# Patient Record
Sex: MALE | Race: BLACK OR AFRICAN AMERICAN | Employment: UNEMPLOYED | URBAN - METROPOLITAN AREA
[De-identification: names, ages, dates, MRNs, and addresses within clinical notes are randomized per-mention and may not be internally consistent; named-entity substitution may affect disease eponyms.]

---

## 2024-06-27 ENCOUNTER — OFFICE VISIT (OUTPATIENT)
Age: 69
End: 2024-06-27
Payer: COMMERCIAL

## 2024-06-27 VITALS
BODY MASS INDEX: 23.7 KG/M2 | HEIGHT: 69 IN | RESPIRATION RATE: 15 BRPM | HEART RATE: 93 BPM | TEMPERATURE: 97.9 F | SYSTOLIC BLOOD PRESSURE: 140 MMHG | DIASTOLIC BLOOD PRESSURE: 86 MMHG | OXYGEN SATURATION: 94 % | WEIGHT: 160 LBS

## 2024-06-27 DIAGNOSIS — K40.90 RIGHT INGUINAL HERNIA: Primary | ICD-10-CM

## 2024-06-27 PROCEDURE — 99203 OFFICE O/P NEW LOW 30 MIN: CPT | Performed by: SURGERY

## 2024-06-27 PROCEDURE — 1123F ACP DISCUSS/DSCN MKR DOCD: CPT | Performed by: SURGERY

## 2024-06-27 RX ORDER — LISINOPRIL AND HYDROCHLOROTHIAZIDE 25; 20 MG/1; MG/1
1 TABLET ORAL DAILY
COMMUNITY
Start: 2024-01-03

## 2024-06-27 ASSESSMENT — ENCOUNTER SYMPTOMS
CONSTIPATION: 0
VOMITING: 0
ABDOMINAL PAIN: 0
NAUSEA: 0
COUGH: 1

## 2024-06-27 NOTE — PROGRESS NOTES
Identified patient with two patient identifiers (name and ). Reviewed chart in preparation for visit and have obtained necessary documentation.    Bautista Walker is a 69 y.o. male  Chief Complaint   Patient presents with    New Patient    Inguinal Hernia     right     BP (!) 140/86   Pulse 93   Temp 97.9 °F (36.6 °C) (Oral)   Resp 15   Ht 1.753 m (5' 9\")   Wt 72.6 kg (160 lb)   SpO2 94%   BMI 23.63 kg/m²     1. Have you been to the ER, urgent care clinic since your last visit?  Hospitalized since your last visit?no    2. Have you seen or consulted any other health care providers outside of the [facility] since your last visit?  Include any pap smears or colon screening. yes - Yes PCP     Patient and provider made aware of elevated BP x2. Patient asymptomatic. Patient reminded to monitor BP, continue to take BP medications if prescribed, and follow up with PCP/Cardiologist.  Patient expressed understanding and agreement.      
abdominal tenderness. There is no guarding or rebound.      Hernia: A hernia is present. Hernia is present in the right inguinal area (Reducible). There is no hernia in the left inguinal area.   Musculoskeletal:         General: Normal range of motion.      Cervical back: Neck supple.   Lymphadenopathy:      Upper Body:      Right upper body: Supraclavicular adenopathy (Most likely reactive.) present.   Neurological:      General: No focal deficit present.      Mental Status: He is alert.     ASSESSMENT and PLAN  Mr. Walker is a 69 y.o. male with a right inguinal hernia. I briefly discussed mesh inguinal hernia repair with him today including the potential risks of bleeding, infection, injury to nerves, injury to blood vessels, injury to the blood supply or function of the testicle and hernia recurrence. Mr. Walker understands but does not wish to proceed with surgery at this time since the hernia is not bothersome to him. Also explained to Mr. Walker that he appears to have a right supraclavicular lymph node which is most likely reactive. Activity as tolerated. Follow up with Ms. Salomon as scheduled. Will see as needed.      CC: Courtney Salomon, NP-C